# Patient Record
Sex: MALE | Race: OTHER | Employment: FULL TIME | URBAN - METROPOLITAN AREA
[De-identification: names, ages, dates, MRNs, and addresses within clinical notes are randomized per-mention and may not be internally consistent; named-entity substitution may affect disease eponyms.]

---

## 2018-01-16 ENCOUNTER — OFFICE VISIT (OUTPATIENT)
Dept: FAMILY MEDICINE CLINIC | Facility: CLINIC | Age: 23
End: 2018-01-16

## 2018-01-16 VITALS
HEART RATE: 66 BPM | RESPIRATION RATE: 20 BRPM | DIASTOLIC BLOOD PRESSURE: 67 MMHG | SYSTOLIC BLOOD PRESSURE: 125 MMHG | WEIGHT: 167 LBS | TEMPERATURE: 98 F | HEIGHT: 68 IN | BODY MASS INDEX: 25.31 KG/M2

## 2018-01-16 DIAGNOSIS — J06.9 ACUTE UPPER RESPIRATORY INFECTION: Primary | ICD-10-CM

## 2018-01-16 PROCEDURE — 99213 OFFICE O/P EST LOW 20 MIN: CPT | Performed by: PHYSICIAN ASSISTANT

## 2018-01-16 PROCEDURE — 99212 OFFICE O/P EST SF 10 MIN: CPT | Performed by: PHYSICIAN ASSISTANT

## 2018-01-16 RX ORDER — AZITHROMYCIN 250 MG/1
TABLET, FILM COATED ORAL
Qty: 6 TABLET | Refills: 0 | Status: SHIPPED | OUTPATIENT
Start: 2018-01-16

## 2018-01-16 RX ORDER — BENZONATATE 100 MG/1
100 CAPSULE ORAL 3 TIMES DAILY PRN
Qty: 30 CAPSULE | Refills: 0 | Status: SHIPPED | OUTPATIENT
Start: 2018-01-16

## 2018-01-17 NOTE — PROGRESS NOTES
HPI:    Patient ID: John Paul Cintron is a 20year old male. Patient presents for cough, congestion, sore throat, chills and body aches for past four days. He complains of associated headache. No difficulty swallowing, weakness, or dizziness.   Patient de Lymphadenopathy:     He has no cervical adenopathy. Neurological: He is alert and oriented to person, place, and time. No cranial nerve deficit. Skin: Skin is warm and dry. Psychiatric: He has a normal mood and affect. Vitals reviewed.

## 2018-01-18 ENCOUNTER — TELEPHONE (OUTPATIENT)
Dept: OTHER | Age: 23
End: 2018-01-18

## 2018-01-18 NOTE — TELEPHONE ENCOUNTER
Pt stts still has chills, feels achy and sore throat. No fever. Pt needs return to work note. Appt made with Tenny Galeazzi tomorrow. Cannot come in today.

## 2018-07-20 ENCOUNTER — APPOINTMENT (OUTPATIENT)
Dept: CT IMAGING | Facility: HOSPITAL | Age: 23
End: 2018-07-20
Attending: EMERGENCY MEDICINE
Payer: COMMERCIAL

## 2018-07-20 ENCOUNTER — HOSPITAL ENCOUNTER (EMERGENCY)
Facility: HOSPITAL | Age: 23
Discharge: ANOTHER HOSPITAL | End: 2018-07-20
Payer: COMMERCIAL

## 2018-07-20 ENCOUNTER — NURSE TRIAGE (OUTPATIENT)
Dept: FAMILY MEDICINE CLINIC | Facility: CLINIC | Age: 23
End: 2018-07-20

## 2018-07-20 ENCOUNTER — HOSPITAL ENCOUNTER (OUTPATIENT)
Age: 23
Discharge: ANOTHER HOSPITAL | End: 2018-07-20
Attending: EMERGENCY MEDICINE
Payer: COMMERCIAL

## 2018-07-20 ENCOUNTER — HOSPITAL ENCOUNTER (EMERGENCY)
Facility: HOSPITAL | Age: 23
Discharge: HOME | End: 2018-07-20
Attending: EMERGENCY MEDICINE
Payer: COMMERCIAL

## 2018-07-20 ENCOUNTER — APPOINTMENT (OUTPATIENT)
Dept: GENERAL RADIOLOGY | Age: 23
End: 2018-07-20
Attending: EMERGENCY MEDICINE
Payer: COMMERCIAL

## 2018-07-20 VITALS
OXYGEN SATURATION: 97 % | DIASTOLIC BLOOD PRESSURE: 78 MMHG | WEIGHT: 180 LBS | TEMPERATURE: 99 F | HEART RATE: 60 BPM | RESPIRATION RATE: 18 BRPM | BODY MASS INDEX: 26.66 KG/M2 | SYSTOLIC BLOOD PRESSURE: 117 MMHG | HEIGHT: 69 IN

## 2018-07-20 VITALS
TEMPERATURE: 98 F | OXYGEN SATURATION: 100 % | RESPIRATION RATE: 20 BRPM | DIASTOLIC BLOOD PRESSURE: 78 MMHG | SYSTOLIC BLOOD PRESSURE: 137 MMHG | HEART RATE: 85 BPM

## 2018-07-20 DIAGNOSIS — S02.600A CLOSED FRACTURE OF BODY OF MANDIBLE, UNSPECIFIED LATERALITY, INITIAL ENCOUNTER (HCC): Primary | ICD-10-CM

## 2018-07-20 DIAGNOSIS — S00.83XA CONTUSION OF FACE, INITIAL ENCOUNTER: ICD-10-CM

## 2018-07-20 DIAGNOSIS — S02.642A CLOSED FRACTURE OF LEFT RAMUS OF MANDIBLE, INITIAL ENCOUNTER (HCC): Primary | ICD-10-CM

## 2018-07-20 PROCEDURE — 70486 CT MAXILLOFACIAL W/O DYE: CPT | Performed by: EMERGENCY MEDICINE

## 2018-07-20 PROCEDURE — 99213 OFFICE O/P EST LOW 20 MIN: CPT

## 2018-07-20 PROCEDURE — 70110 X-RAY EXAM OF JAW 4/> VIEWS: CPT | Performed by: EMERGENCY MEDICINE

## 2018-07-20 PROCEDURE — 99284 EMERGENCY DEPT VISIT MOD MDM: CPT

## 2018-07-20 RX ORDER — HYDROCODONE BITARTRATE AND ACETAMINOPHEN 5; 325 MG/1; MG/1
1-2 TABLET ORAL EVERY 4 HOURS PRN
Qty: 10 TABLET | Refills: 0 | Status: SHIPPED | OUTPATIENT
Start: 2018-07-20 | End: 2018-07-27
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
## 2018-07-20 NOTE — TELEPHONE ENCOUNTER
Action Requested: Summary for Provider     []  Critical Lab, Recommendations Needed  [] Need Additional Advice  [x]   FYI    []   Need Orders  [] Need Medications Sent to Pharmacy  []  Other     SUMMARY: SENT TO ER, possible mandibular fracture    Pt state
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
## 2018-07-21 NOTE — ED PROVIDER NOTES
Patient Seen in: [facility] Emergency Department    History   Patient presents with:  Jaw Pain    Stated Complaint: sent by [address] [place] [address] for broken jaw, needs admission for surgery in AM     HPI    Patient is a 24-year-old male who presents with left-sided or retractions  Extremities: FROM of all extremities, no cyanosis/clubbing/edema  Neuro: CN intact, normal speech, normal gait, 5/5 motor strength in all extremities, no focal deficits  SKIN: warm, dry, no rashes        ED Course   Labs Reviewed - No data encounter [facility])  (primary encounter diagnosis)    Disposition:  Discharge  7/20/2018 10:54 pm    Follow-up:  Jessica Palomino DDS  [address]  [phone]    Call          Medications Prescribed:  Current Discharge Medic
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
## 2023-06-08 ENCOUNTER — HOSPITAL ENCOUNTER (EMERGENCY)
Facility: HOSPITAL | Age: 28
Discharge: HOME | End: 2023-06-08
Attending: EMERGENCY MEDICINE

## 2023-06-08 ENCOUNTER — APPOINTMENT (OUTPATIENT)
Dept: GENERAL RADIOLOGY | Facility: HOSPITAL | Age: 28
End: 2023-06-08
Attending: EMERGENCY MEDICINE

## 2023-06-08 VITALS
RESPIRATION RATE: 16 BRPM | HEART RATE: 68 BPM | BODY MASS INDEX: 28.14 KG/M2 | HEIGHT: 69 IN | SYSTOLIC BLOOD PRESSURE: 110 MMHG | OXYGEN SATURATION: 100 % | DIASTOLIC BLOOD PRESSURE: 70 MMHG | WEIGHT: 190 LBS | TEMPERATURE: 97 F

## 2023-06-08 DIAGNOSIS — L03.019 ONYCHIA AND PARONYCHIA OF FINGER: Primary | ICD-10-CM

## 2023-06-08 PROCEDURE — 10060 I&D ABSCESS SIMPLE/SINGLE: CPT

## 2023-06-08 PROCEDURE — 73140 X-RAY EXAM OF FINGER(S): CPT | Performed by: EMERGENCY MEDICINE

## 2023-06-08 PROCEDURE — 99283 EMERGENCY DEPT VISIT LOW MDM: CPT

## 2023-06-08 PROCEDURE — 99284 EMERGENCY DEPT VISIT MOD MDM: CPT

## 2023-06-08 RX ORDER — LIDOCAINE HYDROCHLORIDE 10 MG/ML
20 INJECTION, SOLUTION EPIDURAL; INFILTRATION; INTRACAUDAL; PERINEURAL ONCE
Status: COMPLETED | OUTPATIENT
Start: 2023-06-08 | End: 2023-06-08

## 2023-06-08 RX ORDER — SULFAMETHOXAZOLE AND TRIMETHOPRIM 800; 160 MG/1; MG/1
1 TABLET ORAL 2 TIMES DAILY
Qty: 14 TABLET | Refills: 0 | Status: SHIPPED | OUTPATIENT
Start: 2023-06-08 | End: 2023-06-15

## 2023-06-08 RX ORDER — CEPHALEXIN 500 MG/1
500 CAPSULE ORAL 2 TIMES DAILY
Qty: 14 CAPSULE | Refills: 0 | Status: SHIPPED | OUTPATIENT
Start: 2023-06-08 | End: 2023-06-15

## 2023-06-08 NOTE — ED QUICK NOTES
Finger drained by dr Dafne Lott  Tolerated well  Finger was wrapped in fresh bandage and home care was addressed

## 2023-06-08 NOTE — DISCHARGE INSTRUCTIONS
Take the antibiotics prescribed. Return to the ER if you are unable to flex the finger, if there is infection of the pad of your finger, or red streaks up the finger into the hand or fevers.

## 2023-06-08 NOTE — ED INITIAL ASSESSMENT (HPI)
Prt presets to the Er with c/o Left hand 3rd digit pain and swelling. Reports closing it in a refrigerator door last Thursday.  However swelling became worse in past 2 days

## 2024-07-08 ENCOUNTER — APPOINTMENT (OUTPATIENT)
Dept: GENERAL RADIOLOGY | Age: 29
End: 2024-07-08
Attending: NURSE PRACTITIONER
Payer: COMMERCIAL

## 2024-07-08 ENCOUNTER — HOSPITAL ENCOUNTER (OUTPATIENT)
Age: 29
Discharge: HOME | End: 2024-07-08
Payer: COMMERCIAL

## 2024-07-08 VITALS
SYSTOLIC BLOOD PRESSURE: 135 MMHG | DIASTOLIC BLOOD PRESSURE: 64 MMHG | RESPIRATION RATE: 16 BRPM | OXYGEN SATURATION: 99 % | HEART RATE: 67 BPM | TEMPERATURE: 98 F

## 2024-07-08 DIAGNOSIS — M79.609 PAIN IN LIMB: Primary | ICD-10-CM

## 2024-07-08 DIAGNOSIS — S69.91XA INJURY OF RIGHT HAND, INITIAL ENCOUNTER: ICD-10-CM

## 2024-07-08 PROCEDURE — L3908 WHO COCK-UP NONMOLDE PRE OTS: HCPCS | Performed by: NURSE PRACTITIONER

## 2024-07-08 PROCEDURE — 73130 X-RAY EXAM OF HAND: CPT | Performed by: NURSE PRACTITIONER

## 2024-07-08 PROCEDURE — 99213 OFFICE O/P EST LOW 20 MIN: CPT | Performed by: NURSE PRACTITIONER
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
## 2024-07-09 NOTE — DISCHARGE INSTRUCTIONS
Tylenol 650 mg every 4-6 hours as needed  Ibuprofen 600 mg every 6 hours with food  Wrist brace for comfort  Ice over towel 20 minutes at a time a few times per day  Please follow up with hand specialist as discussed

## 2024-07-09 NOTE — ED PROVIDER NOTES
Patient Seen in: [facility]      History     Chief Complaint   Patient presents with    Arm or Hand Injury     Stated Complaint: rt hand pain    Subjective:   Patient is a 28-year-old male who presents today for right hand injury.  Reports this past Saturday, a 20 pound speaker fell on patient's hand.  Has had pain to the dorsal aspect of the hand along with some swelling.  No numbness or tingling.  Also reports mild wrist pain.        Objective:   No pertinent past medical history.            No pertinent past surgical history.              No pertinent social history.            Review of Systems   All other systems reviewed and are negative.      Positive for stated Chief Complaint: Arm or Hand Injury    Other systems are as noted in HPI.  Constitutional and vital signs reviewed.      All other systems reviewed and negative except as noted above.    Physical Exam     ED Triage Vitals [07/08/24 1856]   /64   Pulse 67   Resp 16   Temp 98.2 °F (36.8 °C)   Temp src Temporal   SpO2 99 %   O2 Device None (Room air)       Current Vitals:   Vital Signs  BP: 135/64  Pulse: 67  Resp: 16  Temp: 98.2 °F (36.8 °C)  Temp src: Temporal    Oxygen Therapy  SpO2: 99 %  O2 Device: None (Room air)            Physical Exam  Constitutional:       Appearance: Normal appearance.   Musculoskeletal:      Right forearm: Normal.      Right wrist: Bony tenderness (ulnar aspect only) present. No swelling or snuff box tenderness. Normal range of motion. Normal pulse.      Right hand: Swelling and bony tenderness (proximal aspect of dorsum of hand- generalized) present. Normal range of motion. Normal strength. Normal sensation. Normal capillary refill. Normal pulse.   Neurological:      Mental Status: He is alert.         ED Course   Labs Reviewed - No data to display    MDM        Medical Decision Making  Differentials considered: Right hand fracture versus right wrist fracture versus right hand contusion versus right wrist  contusion versus other soft tissue injury    HPI and exam consistent with right hand contusion.  No acute fracture noted on right hand x-ray. Incidental finding of chronic 3rd tuft fracture, discussed this with patient, he does admit to remote injury several months ago.  He is not tender in this area today on exam.  Patient placed in wrist Velcro splint.  Discussed rest, ice, Tylenol, ibuprofen.  He was advised to follow-up with hand specialist if no improvement within the next 2 to 3 days.  Patient verbalized understanding and agreeable to plan of care.     Amount and/or Complexity of Data Reviewed  Radiology: ordered and independent interpretation performed. Decision-making details documented in ED Course.     Details: I personally reviewed right hand x-ray: No acute fracture    Risk  OTC drugs.        Disposition and Plan     Clinical Impression:  1. Pain in limb    2. Injury of right hand, initial encounter         Disposition:  Discharge  7/8/2024  7:34 pm    Follow-up:  Blake Rutledge MD  [address] [place] [id number]  [phone]                Medications Prescribed:  Discharge Medication List as of 7/8/2024  7:35 PM

## 2024-11-06 ENCOUNTER — HOSPITAL ENCOUNTER (EMERGENCY)
Facility: HOSPITAL | Age: 29
Discharge: HOME | End: 2024-11-06
Attending: EMERGENCY MEDICINE
Payer: COMMERCIAL

## 2024-11-06 VITALS
DIASTOLIC BLOOD PRESSURE: 79 MMHG | RESPIRATION RATE: 18 BRPM | WEIGHT: 180 LBS | TEMPERATURE: 98 F | OXYGEN SATURATION: 96 % | SYSTOLIC BLOOD PRESSURE: 129 MMHG | HEIGHT: 70 IN | HEART RATE: 73 BPM | BODY MASS INDEX: 25.77 KG/M2

## 2024-11-06 DIAGNOSIS — K08.89 PAIN, DENTAL: Primary | ICD-10-CM

## 2024-11-06 PROCEDURE — 99283 EMERGENCY DEPT VISIT LOW MDM: CPT

## 2024-11-06 PROCEDURE — 99284 EMERGENCY DEPT VISIT MOD MDM: CPT

## 2024-11-06 RX ORDER — AMOXICILLIN 500 MG/1
500 TABLET, FILM COATED ORAL 2 TIMES DAILY
Qty: 20 TABLET | Refills: 0 | Status: SHIPPED | OUTPATIENT
Start: 2024-11-06 | End: 2024-11-16

## 2024-11-06 RX ORDER — IBUPROFEN 600 MG/1
600 TABLET, FILM COATED ORAL EVERY 6 HOURS PRN
Qty: 30 TABLET | Refills: 0 | Status: SHIPPED | OUTPATIENT
Start: 2024-11-06 | End: 2024-11-13

## 2024-11-06 RX ORDER — TRAMADOL HYDROCHLORIDE 50 MG/1
TABLET ORAL EVERY 6 HOURS PRN
Qty: 20 TABLET | Refills: 0 | Status: SHIPPED | OUTPATIENT
Start: 2024-11-06 | End: 2024-11-13

## 2024-11-06 RX ORDER — LIDOCAINE HYDROCHLORIDE 20 MG/ML
5 SOLUTION OROPHARYNGEAL ONCE
Status: COMPLETED | OUTPATIENT
Start: 2024-11-06 | End: 2024-11-06

## 2024-11-06 RX ORDER — TRAMADOL HYDROCHLORIDE 50 MG/1
50 TABLET ORAL ONCE
Status: COMPLETED | OUTPATIENT
Start: 2024-11-06 | End: 2024-11-06

## 2024-11-06 RX ORDER — IBUPROFEN 600 MG/1
600 TABLET, FILM COATED ORAL ONCE
Status: COMPLETED | OUTPATIENT
Start: 2024-11-06 | End: 2024-11-06

## 2024-11-06 NOTE — ED PROVIDER NOTES
Patient Seen in: [facility] Emergency Department      History     Chief Complaint   Patient presents with    Dental Problem     Stated Complaint: Tooth Pain    Subjective:   The history is provided by the patient.         29 year old male with dental pain.  He states he has had problems with his teeth all his life and then in the past 2 weeks his to upper first molars started hurting more.  The left one has broken in the past and does not hurt as bad as the right.  He states he gets a bad headache that radiates into his temples both sides.  He has not noticed any abscess or drainage.  He recently got insurance and is planning to see a dentist but has not yet.    Objective:     History reviewed. No pertinent past medical history.           History reviewed. No pertinent surgical history.             Social History     Socioeconomic History    Marital status: Single   Tobacco Use    Smoking status: Every Day     Passive exposure: Never    Smokeless tobacco: Current   Vaping Use    Vaping status: Some Days   Substance and Sexual Activity    Alcohol use: No     Alcohol/week: 0.0 standard drinks of alcohol    Drug use: No   Other Topics Concern    Caffeine Concern Yes     Comment: Soda                  Physical Exam     ED Triage Vitals [11/06/24 0222]   BP (!) 161/90   Pulse 64   Resp 18   Temp 98 °F (36.7 °C)   Temp src    SpO2 99 %   O2 Device None (Room air)       Current Vitals:   No data recorded      Physical Exam  Vitals and nursing note reviewed.   Constitutional:       General: He is not in acute distress.     Appearance: Normal appearance. He is well-developed. He is not ill-appearing, toxic-appearing or diaphoretic.   HENT:      Head: Normocephalic and atraumatic.      Jaw: No trismus.      Right Ear: External ear normal.      Left Ear: External ear normal.      Nose: Nose normal.      Mouth/Throat:      Dentition: Abnormal dentition. Dental caries present. No dental abscesses.      Pharynx: Uvula  midline. No uvula swelling.      Comments: Bilateral upper molars with ttp and some dental breakdown, no acute abscess. +mild gingival . No loose teeth.  Eyes:      Conjunctiva/sclera: Conjunctivae normal.      Pupils: Pupils are equal, round, and reactive to light.   Neck:      Trachea: Trachea and phonation normal.   Cardiovascular:      Rate and Rhythm: Normal rate.   Pulmonary:      Effort: Pulmonary effort is normal. No respiratory distress.   Musculoskeletal:      Cervical back: Full passive range of motion without pain, normal range of motion and neck supple. No edema or rigidity. No spinous process tenderness or muscular tenderness.   Skin:     Findings: No rash.   Neurological:      Mental Status: He is alert and oriented to person, place, and time. He is not disoriented.      GCS: GCS eye subscore is 4. GCS verbal subscore is 5. GCS motor subscore is 6.      Cranial Nerves: No cranial nerve deficit.      Sensory: No sensory deficit.      Coordination: Coordination normal.   Psychiatric:         Behavior: Behavior normal.             ED Course   Labs Reviewed - No data to display       MDM      Pulse Ox: 96%, Normal, RA    Medications   ibuprofen (Motrin) tab 600 mg (600 mg Oral Given 11/6/24 0308)   traMADol (Ultram) tab 50 mg (50 mg Oral Given 11/6/24 0308)   traMADol (Ultram) tab 50 mg (50 mg Oral Given 11/6/24 0338)   Lidocaine Viscous HCl (XYLOCAINE) 2 % mouth solution 5 mL (5 mL Mouth/Throat Given 11/6/24 0338)       Pt moaning in pain.   He got relief with tramadol, ibuprofen, and topical viscous lidocaine combination. Pt will f/u with dentist, prophylactic abx.              Disposition and Plan     Clinical Impression:  1. Pain, dental         Disposition:  Discharge  11/6/2024  3:42 am    Follow-up:  [facility]  [address] [place] [id number]  [phone]  Schedule an appointment as soon as possible for a visit  Call for next available appointment if you  need a new dentist          Medications Prescribed:  Discharge Medication List as of 11/6/2024  4:36 AM        START taking these medications    Details   traMADol 50 MG Oral Tab Take 1-2 tablets ( mg total) by mouth every 6 (six) hours as needed for Pain (Do not take while driving)., Normal, Disp-20 tablet, R-0      amoxicillin 500 MG Oral Tab Take 1 tablet (500 mg total) by mouth 2 (two) times daily for 10 days., Normal, Disp-20 tablet, R-0      ibuprofen 600 MG Oral Tab Take 1 tablet (600 mg total) by mouth every 6 (six) hours as needed., Normal, Disp-30 tablet, R-0                 Supplementary Documentation:

## 2024-11-06 NOTE — ED INITIAL ASSESSMENT (HPI)
Right upper dental pain on and off for the past several months now increased tonight causing pt to not be able to sleep.

## 2024-11-06 NOTE — ED QUICK NOTES
Patient stating relief of pain after medications. Discharge instructions including follow-up care and medications were reviewed and discussed with patient. Pt verbalized understanding to all information and all questions asked were answered at this time. Pt is AAOx4, calm, respirations noted as even and unlabored, skin warm and dry, and there are no signs or symptoms of distress noted at this time. Pt ambulatory with a steady gait to exit.

## (undated) NOTE — LETTER
Date & Time: 7/8/2024, 7:34 PM  Patient: Forrest Garcia  Encounter Provider(s):    Buelah Castro APRN       To Whom It May Concern:    Forrest Garcia was seen and treated in our department on 7/8/2024. He can return to work Friday 7/12/2024.     If you have any questions or concerns, please do not hesitate to call.      NIYA HarrisonP-BC  _____________________________  Physician/APC Signature

## (undated) NOTE — ED AVS SNAPSHOT
Lynn Deleon   MRN: [id number]    Department:  [facility] Emergency Department   Date of Visit:  7/20/2018           Disclosure     Insurance plans vary and the physician(s) referred by the ER may not be covered by your plan.  Please contact y CARE PHYSICIAN AT ONCE OR RETURN IMMEDIATELY TO THE EMERGENCY DEPARTMENT. If you have been prescribed any medication(s), please fill your prescription right away and begin taking the medication(s) as directed.   If you believe that any of the medications

## (undated) NOTE — LETTER
1/16/2018              Dennis Olivares        [address] [place] [address]        [place] [id number]         To whom it may concern,    Ambrose Cueva was seen in the office today. Please excuse him from work 1/16/18-1/17/18 due to illness. He may return 1/18/18.

## (undated) NOTE — LETTER
Date & Time: 6/8/2023, 11:56 AM  Patient: Laura Rogel  Encounter Provider(s):    Elvia Schwartz MD       To Whom It May Concern:    Laura Rogel was seen and treated in our department on 6/8/2023.  He may return 6/12/2023    If you have any questions or concerns, please do not hesitate to call.        _____________________________  Physician/APC Signature